# Patient Record
Sex: FEMALE | Race: BLACK OR AFRICAN AMERICAN | NOT HISPANIC OR LATINO | URBAN - METROPOLITAN AREA
[De-identification: names, ages, dates, MRNs, and addresses within clinical notes are randomized per-mention and may not be internally consistent; named-entity substitution may affect disease eponyms.]

---

## 2023-11-24 ENCOUNTER — OFFICE VISIT (OUTPATIENT)
Dept: OPHTHALMOLOGY | Facility: CLINIC | Age: 53
End: 2023-11-24
Payer: COMMERCIAL

## 2023-11-24 DIAGNOSIS — H18.613 STABLE KERATOCONUS OF BOTH EYES: Primary | ICD-10-CM

## 2023-11-24 PROCEDURE — 99213 OFFICE O/P EST LOW 20 MIN: CPT | Performed by: OPTOMETRIST

## 2023-11-24 ASSESSMENT — SLIT LAMP EXAM - LIDS
COMMENTS: NORMAL
COMMENTS: NORMAL

## 2023-11-24 ASSESSMENT — ENCOUNTER SYMPTOMS
GASTROINTESTINAL NEGATIVE: 0
MUSCULOSKELETAL NEGATIVE: 0
PSYCHIATRIC NEGATIVE: 0
CARDIOVASCULAR NEGATIVE: 0
RESPIRATORY NEGATIVE: 0
NEUROLOGICAL NEGATIVE: 0
CONSTITUTIONAL NEGATIVE: 0
ALLERGIC/IMMUNOLOGIC NEGATIVE: 0
EYES NEGATIVE: 0
HEMATOLOGIC/LYMPHATIC NEGATIVE: 0
ENDOCRINE NEGATIVE: 0

## 2023-11-24 ASSESSMENT — REFRACTION_CURRENTRX
OD_BASECURVE: 7.4
OD_DIAMETER: 9.2
OS_BRAND: AVT PRIME KONE
OD_BRAND: AVT PRIME KONE
OS_DIAMETER: 9.6
OS_BASECURVE: 7.5
OD_SPHERE: -7.25
OS_SPHERE: -8.00

## 2023-11-24 ASSESSMENT — VISUAL ACUITY
OD_CC+: -1
OS_CC: 20/20
OD_CC: 20/25
OS_CC+: -1
CORRECTION_TYPE: CONTACTS
METHOD: SNELLEN - LINEAR

## 2023-11-24 ASSESSMENT — CONF VISUAL FIELD
OD_INFERIOR_NASAL_RESTRICTION: 0
OS_NORMAL: 1
OD_INFERIOR_TEMPORAL_RESTRICTION: 0
OS_INFERIOR_NASAL_RESTRICTION: 0
OS_SUPERIOR_TEMPORAL_RESTRICTION: 0
OD_NORMAL: 1
OD_SUPERIOR_TEMPORAL_RESTRICTION: 0
OS_INFERIOR_TEMPORAL_RESTRICTION: 0
OS_SUPERIOR_NASAL_RESTRICTION: 0
OD_SUPERIOR_NASAL_RESTRICTION: 0

## 2023-11-24 ASSESSMENT — EXTERNAL EXAM - LEFT EYE: OS_EXAM: NORMAL

## 2023-11-24 ASSESSMENT — REFRACTION_MANIFEST
OS_SPHERE: PLANO
OD_SPHERE: -0.50

## 2023-11-24 ASSESSMENT — EXTERNAL EXAM - RIGHT EYE: OD_EXAM: NORMAL

## 2023-11-24 NOTE — PROGRESS NOTES
Assessment/Plan   Diagnoses and all orders for this visit:  Stable keratoconus of both eyes    Specialty Contact Lenses:  Specialty contact lenses were dispensed today for treatment of a medically indicated condition. The patient was educated on the proper care, handling, insertion and removal of the lenses.  The patient should follow the wearing time and return for follow-up as indicated, and call or come in to the office if the eye becomes red or painful after wearing the lenses.          Patient instructed to use artificial tears and nonpreserved contact lens (CL) rewetting drops in both eyes. Monitor yearly.     If redness does not resolved from superficial punctate keratitis (SPK)/pinguecula on right eye (OD) with new contact lens (CL) and frequent lubrication then return to office    Otherwise FU 1 year